# Patient Record
Sex: FEMALE | Race: WHITE | NOT HISPANIC OR LATINO | Employment: UNEMPLOYED | URBAN - METROPOLITAN AREA
[De-identification: names, ages, dates, MRNs, and addresses within clinical notes are randomized per-mention and may not be internally consistent; named-entity substitution may affect disease eponyms.]

---

## 2023-04-03 ENCOUNTER — HOSPITAL ENCOUNTER (OUTPATIENT)
Dept: DATA CONVERSION | Facility: HOSPITAL | Age: 63
End: 2023-04-03
Attending: INTERNAL MEDICINE | Admitting: INTERNAL MEDICINE
Payer: MEDICARE

## 2023-04-03 DIAGNOSIS — I34.0 NONRHEUMATIC MITRAL (VALVE) INSUFFICIENCY: ICD-10-CM

## 2023-04-03 DIAGNOSIS — I34.1 NONRHEUMATIC MITRAL (VALVE) PROLAPSE: ICD-10-CM

## 2023-09-06 VITALS — HEIGHT: 65 IN | WEIGHT: 164.24 LBS | BODY MASS INDEX: 27.36 KG/M2

## 2023-12-05 ENCOUNTER — OFFICE VISIT (OUTPATIENT)
Dept: OBSTETRICS AND GYNECOLOGY | Facility: CLINIC | Age: 63
End: 2023-12-05
Payer: MEDICARE

## 2023-12-05 VITALS
SYSTOLIC BLOOD PRESSURE: 114 MMHG | WEIGHT: 154 LBS | HEIGHT: 65 IN | BODY MASS INDEX: 25.66 KG/M2 | DIASTOLIC BLOOD PRESSURE: 73 MMHG | HEART RATE: 91 BPM

## 2023-12-05 DIAGNOSIS — N95.2 VAGINAL ATROPHY: ICD-10-CM

## 2023-12-05 DIAGNOSIS — Z46.89 PESSARY MAINTENANCE: ICD-10-CM

## 2023-12-05 DIAGNOSIS — N89.8 VAGINAL ITCHING: ICD-10-CM

## 2023-12-05 DIAGNOSIS — N81.4 UTEROVAGINAL PROLAPSE: Primary | ICD-10-CM

## 2023-12-05 DIAGNOSIS — R30.0 DYSURIA: ICD-10-CM

## 2023-12-05 LAB
POC APPEARANCE, URINE: CLEAR
POC BILIRUBIN, URINE: NEGATIVE
POC BLOOD, URINE: ABNORMAL
POC COLOR, URINE: YELLOW
POC GLUCOSE, URINE: ABNORMAL MG/DL
POC KETONES, URINE: NEGATIVE MG/DL
POC LEUKOCYTES, URINE: ABNORMAL
POC NITRITE,URINE: NEGATIVE
POC PH, URINE: 6.5 PH
POC PROTEIN, URINE: ABNORMAL MG/DL
POC SPECIFIC GRAVITY, URINE: 1.02
POC UROBILINOGEN, URINE: 0.2 EU/DL

## 2023-12-05 PROCEDURE — 81003 URINALYSIS AUTO W/O SCOPE: CPT | Performed by: NURSE PRACTITIONER

## 2023-12-05 PROCEDURE — 87086 URINE CULTURE/COLONY COUNT: CPT | Performed by: NURSE PRACTITIONER

## 2023-12-05 PROCEDURE — 1126F AMNT PAIN NOTED NONE PRSNT: CPT | Performed by: NURSE PRACTITIONER

## 2023-12-05 PROCEDURE — 1036F TOBACCO NON-USER: CPT | Performed by: NURSE PRACTITIONER

## 2023-12-05 PROCEDURE — 1159F MED LIST DOCD IN RCRD: CPT | Performed by: NURSE PRACTITIONER

## 2023-12-05 PROCEDURE — 99213 OFFICE O/P EST LOW 20 MIN: CPT | Mod: PO | Performed by: NURSE PRACTITIONER

## 2023-12-05 PROCEDURE — 87205 SMEAR GRAM STAIN: CPT | Performed by: NURSE PRACTITIONER

## 2023-12-05 PROCEDURE — 99213 OFFICE O/P EST LOW 20 MIN: CPT | Performed by: NURSE PRACTITIONER

## 2023-12-05 RX ORDER — ASPIRIN 81 MG/1
TABLET ORAL
COMMUNITY
Start: 2023-07-27

## 2023-12-05 RX ORDER — CEPHALEXIN 500 MG/1
1 CAPSULE ORAL 2 TIMES DAILY
COMMUNITY
Start: 2022-12-16

## 2023-12-05 RX ORDER — ESTRADIOL 0.1 MG/G
1 CREAM VAGINAL 2 TIMES WEEKLY
Qty: 42.5 G | Refills: 1 | Status: SHIPPED | OUTPATIENT
Start: 2023-12-07 | End: 2024-12-06

## 2023-12-05 ASSESSMENT — ENCOUNTER SYMPTOMS
CARDIOVASCULAR NEGATIVE: 1
DYSURIA: 1
PSYCHIATRIC NEGATIVE: 1
EYES NEGATIVE: 1
CONSTITUTIONAL NEGATIVE: 1
ENDOCRINE NEGATIVE: 1
ALLERGIC/IMMUNOLOGIC NEGATIVE: 1
RESPIRATORY NEGATIVE: 1
NEUROLOGICAL NEGATIVE: 1
MUSCULOSKELETAL NEGATIVE: 1
HEMATOLOGIC/LYMPHATIC NEGATIVE: 1
GASTROINTESTINAL NEGATIVE: 1

## 2023-12-05 ASSESSMENT — PATIENT HEALTH QUESTIONNAIRE - PHQ9
1. LITTLE INTEREST OR PLEASURE IN DOING THINGS: NOT AT ALL
2. FEELING DOWN, DEPRESSED OR HOPELESS: NOT AT ALL
SUM OF ALL RESPONSES TO PHQ9 QUESTIONS 1 AND 2: 0

## 2023-12-05 ASSESSMENT — PAIN SCALES - GENERAL: PAINLEVEL: 0-NO PAIN

## 2023-12-05 NOTE — PROGRESS NOTES
"Urogynecology Pessary Check Visit  Katt Dhaliwal, APRN-CNP  [phone]      History of Present Illness:  HPI  Ms. Yoanna Melendez  presents for pessary a check, pessary is in place to help manage uterine prolapse and manages this at home on her own. She has concerns today for yeast infection or BV.     Last visit: 2/28/2023   Pessary type: #5 ring with support in place to help manage uterine prolapse   Bleeding? Denies  Discomfort? None   Bowel or bladder symptoms: No changes to baseline   Vaginal estrogen: Not using E2  She manages the pessary at home on her own without any issues.     Urinary Symptoms:   - Endorses some burning dysuria while urinating.   - No hx of Krysta.     Vaginal Symptoms:   - The patient noted having sx of yeast vaginitis around 3 weeks ago and used OTC Vagisil which improved her vaginal itching symptoms. However, her symptoms of vaginal itching/burning returned 2 weeks ago.     Record Review:   - July 2023 @ [facility] her blood glucose level was slightly elevated at 102 mg/dL. She notes being on a new cardiac medication (Jardiance) which could be related to causing elevated glucose levels.     Past medical, surgical, social, family, allergy, and medication histories were reviewed and updated in EPIC charting.       Review of Systems   Constitutional: Negative.    HENT: Negative.     Eyes: Negative.    Respiratory: Negative.     Cardiovascular: Negative.    Gastrointestinal: Negative.    Endocrine: Negative.    Genitourinary:  Positive for dysuria and vaginal discharge.   Musculoskeletal: Negative.    Skin: Negative.    Allergic/Immunologic: Negative.    Neurological: Negative.    Hematological: Negative.    Psychiatric/Behavioral: Negative.          Objective    /73   Pulse 91   Ht 1.651 m (5' 5\")   Wt 69.9 kg (154 lb)   BMI 25.63 kg/m²    Body mass index is 25.63 kg/m².     UA = >500 glucose, moderate blood, trace protein, trace leukocytes    Physical Exam:  Physical " Exam  Constitutional:       Appearance: Normal appearance.   Genitourinary:      Moderate vaginal atrophy present.     Vaginal exam comments: Collected e-swab.   HENT:      Head: Normocephalic and atraumatic.   Pulmonary:      Effort: Pulmonary effort is normal.   Psychiatric:         Mood and Affect: Mood normal.         Behavior: Behavior normal.         Thought Content: Thought content normal.         Judgment: Judgment normal.       Pelvic exam:   Speculum examination: The vagina and cervix were inspected and there were some superficial erosions (that did not require cauterization). No blood in vaginal vault. Normal discharge appreciated.  Collected e-swab. Moderate vaginal atrophy noted.   Bimanual exam: The uterus was small and mobile.  There were no masses or tenderness in the pelvis/adnexal region.   The pessary was removed, cleaned and replaced without complications.       Assessment & Plan:  63 y.o. woman with vaginal atrophy, dysuria/glycosuria caused by taking Jardiance, and uterine prolapse that is managed with a pessary. Comorbidities include: ALONZO and HLD.     Diagnoses:   #1 Uterine prolapse  #2 Vaginitis, vaginal itching  #3 Vaginal atrophy    Plan:    1. Uterine prolapse, pessary management  - #5 ring with support pessary was removed. Speculum exam did not reveal any areas of active bleeding or granulation tissue. There were some superficial erosions. Pessary was cleaned and replaced back.  - Of note she manages the pessary at home on her own without any issues.   - Satisfactory management with pessary with a few superficial erosions erosions. Continue current care.       2. Dysuria, glycosuria, hematuria, ddx: Glycosuria caused by taking Jardiance > acute UTI vs. diabetes  - POCT UA showed moderate blood, >500 glucose, trace protein, and trace leukocytes.   - Urine culture ordered.  - Counseled that poorly managed/undiagnosed diabetes is correlated with frequent UTI and yeast infections. We  encouraged her to possibly follow up with her PCP to check her hemoglobin A1c with >500 glucose in her urine and no prior hx of diabetes. Of note upon record review in July 2023 @ [facility] her blood glucose level was slightly elevated at 102 mg/dL. We are not subconscious she had diabetes but rather taking Jardiance is causing glycosuria and dysuria.   - She notes being on a new cardiac medication (Jardiance) which could be related to causing elevated glucose levels and we encouraged her to follow up with cardiology to further discuss this. We counseled that being on Jardiance is most likely the cause of glycosuria as Jardiance works by causing the kidneys to process/excrete glucose through the urine.     3. Vaginitis, vaginal itching  - She has been symptomatic of vaginitis with vaginal itching/burning for the past 3 weeks that improved with using OTC Vagisil but her sxs ultimately returned.   - Collected e-swab to evaluate for presence of BV or yeast.     4. Vaginal atrophy  - We reassured her that using tv estrogen cream has a localized effect to the vaginal tissue and is not a form of HRT such as po estrogen which produces a systemic effect. The risk of using vaginal estrogen cream in correlation with putting patients at greater risk for developing breast/endometrial cancer is extremely low as the estrogen acts locally and only 0.8% is absorbed systemically.   - Counseled that E2 will help improve vaginal atrophy and prevent erosions from the pessary. She is amenable to starting E2.   - Reassured her that tv estrogen cream should not cause osteoporosis.   - Sent Rx Estradiol cream to her preferred pharmacy with instructions to use intravaginally 2x/week.     Follow up in 3 months with CARIN Fernandez for a pessary check.      CARIN Wasserman-CNP     Scribe Attestation  By signing my name below, IAri Scribe, attest that this documentation has been prepared under the direction and in the  presence of OUMAR Wasserman on 12/05/2023 at 2:22 PM.   I, OUMAR Wasserman, personally performed the services described in this documentation which was scribed virtually and I confirm that it is both accurate and complete.

## 2023-12-06 LAB
BACTERIAL VAGINOSIS VAG-IMP: NORMAL
CLUE CELLS VAG LPF-#/AREA: NORMAL /[LPF]
NUGENT SCORE: 4
YEAST VAG WET PREP-#/AREA: NORMAL

## 2023-12-06 NOTE — RESULT ENCOUNTER NOTE
No vaginal infection,  but starting vaginal estrogen will help increase lactobacilli which is a welcomed effect

## 2023-12-07 LAB — BACTERIA UR CULT: NORMAL

## 2023-12-08 ENCOUNTER — TELEPHONE (OUTPATIENT)
Dept: OBSTETRICS AND GYNECOLOGY | Facility: CLINIC | Age: 63
End: 2023-12-08
Payer: MEDICARE

## 2023-12-08 NOTE — TELEPHONE ENCOUNTER
----- Message from OUMAR Wasserman sent at 12/6/2023  5:49 PM EST -----  No vaginal infection,  but starting vaginal estrogen will help increase lactobacilli which is a welcomed effect

## 2023-12-08 NOTE — TELEPHONE ENCOUNTER
Left voicemail message for pt that results are normal and can be seen on [employer] if she finishes her registration. Left office phone number [phone] to call with further questions.

## 2024-03-05 ENCOUNTER — APPOINTMENT (OUTPATIENT)
Dept: OBSTETRICS AND GYNECOLOGY | Facility: CLINIC | Age: 64
End: 2024-03-05
Payer: MEDICARE

## 2024-03-06 ENCOUNTER — HOSPITAL ENCOUNTER (OUTPATIENT)
Dept: RADIOLOGY | Facility: HOSPITAL | Age: 64
Discharge: HOME | End: 2024-03-06
Payer: MEDICARE

## 2024-03-06 ENCOUNTER — LAB (OUTPATIENT)
Dept: LAB | Facility: LAB | Age: 64
End: 2024-03-06
Payer: MEDICARE

## 2024-03-06 VITALS — WEIGHT: 140 LBS | BODY MASS INDEX: 23.9 KG/M2 | HEIGHT: 64 IN

## 2024-03-06 DIAGNOSIS — Z12.31 ENCOUNTER FOR SCREENING MAMMOGRAM FOR MALIGNANT NEOPLASM OF BREAST: ICD-10-CM

## 2024-03-06 DIAGNOSIS — E55.9 VITAMIN D DEFICIENCY, UNSPECIFIED: Primary | ICD-10-CM

## 2024-03-06 DIAGNOSIS — Z00.00 ENCOUNTER FOR GENERAL ADULT MEDICAL EXAMINATION WITHOUT ABNORMAL FINDINGS: ICD-10-CM

## 2024-03-06 LAB
25(OH)D3 SERPL-MCNC: 30 NG/ML (ref 31–100)
ALBUMIN SERPL-MCNC: 4.2 G/DL (ref 3.5–5)
ALP BLD-CCNC: 60 U/L (ref 35–125)
ALT SERPL-CCNC: 16 U/L (ref 5–40)
ANION GAP SERPL CALC-SCNC: 12 MMOL/L
AST SERPL-CCNC: 18 U/L (ref 5–40)
BASOPHILS # BLD AUTO: 0.04 X10*3/UL (ref 0–0.1)
BASOPHILS NFR BLD AUTO: 1.1 %
BILIRUB SERPL-MCNC: 0.2 MG/DL (ref 0.1–1.2)
BUN SERPL-MCNC: 21 MG/DL (ref 8–25)
CALCIUM SERPL-MCNC: 9.2 MG/DL (ref 8.5–10.4)
CHLORIDE SERPL-SCNC: 104 MMOL/L (ref 97–107)
CHOLEST SERPL-MCNC: 205 MG/DL (ref 0–199)
CHOLESTEROL/HDL RATIO: 3.7
CO2 SERPL-SCNC: 25 MMOL/L (ref 24–31)
CREAT SERPL-MCNC: 1 MG/DL (ref 0.4–1.6)
EGFRCR SERPLBLD CKD-EPI 2021: 63 ML/MIN/1.73M*2
EOSINOPHIL # BLD AUTO: 0.13 X10*3/UL (ref 0–0.7)
EOSINOPHIL NFR BLD AUTO: 3.4 %
ERYTHROCYTE [DISTWIDTH] IN BLOOD BY AUTOMATED COUNT: 12.9 % (ref 11.5–14.5)
GLUCOSE SERPL-MCNC: 110 MG/DL (ref 65–99)
HCT VFR BLD AUTO: 37.8 % (ref 36–46)
HDLC SERPL-MCNC: 55.7 MG/DL
HGB BLD-MCNC: 12.2 G/DL (ref 12–16)
IMM GRANULOCYTES # BLD AUTO: 0 X10*3/UL (ref 0–0.7)
IMM GRANULOCYTES NFR BLD AUTO: 0 % (ref 0–0.9)
LYMPHOCYTES # BLD AUTO: 1.28 X10*3/UL (ref 1.2–4.8)
LYMPHOCYTES NFR BLD AUTO: 34 %
MAGNESIUM SERPL-MCNC: 2.2 MG/DL (ref 1.6–3.1)
MCH RBC QN AUTO: 31.9 PG (ref 26–34)
MCHC RBC AUTO-ENTMCNC: 32.3 G/DL (ref 32–36)
MCV RBC AUTO: 99 FL (ref 80–100)
MONOCYTES # BLD AUTO: 0.23 X10*3/UL (ref 0.1–1)
MONOCYTES NFR BLD AUTO: 6.1 %
NEUTROPHILS # BLD AUTO: 2.09 X10*3/UL (ref 1.2–7.7)
NEUTROPHILS NFR BLD AUTO: 55.4 %
NON-HDL CHOLESTEROL: 149 MG/DL (ref 0–149)
NRBC BLD-RTO: 0 /100 WBCS (ref 0–0)
PLATELET # BLD AUTO: 179 X10*3/UL (ref 150–450)
POTASSIUM SERPL-SCNC: 4 MMOL/L (ref 3.4–5.1)
PROT SERPL-MCNC: 6.2 G/DL (ref 5.9–7.9)
RBC # BLD AUTO: 3.83 X10*6/UL (ref 4–5.2)
SODIUM SERPL-SCNC: 141 MMOL/L (ref 133–145)
TSH SERPL DL<=0.05 MIU/L-ACNC: 0.69 MIU/L (ref 0.27–4.2)
WBC # BLD AUTO: 3.8 X10*3/UL (ref 4.4–11.3)

## 2024-03-06 PROCEDURE — 36415 COLL VENOUS BLD VENIPUNCTURE: CPT

## 2024-03-06 PROCEDURE — 77067 SCR MAMMO BI INCL CAD: CPT | Performed by: RADIOLOGY

## 2024-03-06 PROCEDURE — 77063 BREAST TOMOSYNTHESIS BI: CPT | Performed by: RADIOLOGY

## 2024-03-06 PROCEDURE — 80053 COMPREHEN METABOLIC PANEL: CPT

## 2024-03-06 PROCEDURE — 83735 ASSAY OF MAGNESIUM: CPT

## 2024-03-06 PROCEDURE — 77067 SCR MAMMO BI INCL CAD: CPT

## 2024-03-06 PROCEDURE — 82306 VITAMIN D 25 HYDROXY: CPT

## 2024-03-06 PROCEDURE — 82465 ASSAY BLD/SERUM CHOLESTEROL: CPT | Mod: WAIVER OF LIABILITY ON FILE

## 2024-03-06 PROCEDURE — 84443 ASSAY THYROID STIM HORMONE: CPT | Mod: WAIVER OF LIABILITY ON FILE

## 2024-03-06 PROCEDURE — 83718 ASSAY OF LIPOPROTEIN: CPT | Mod: WAIVER OF LIABILITY ON FILE

## 2024-03-06 PROCEDURE — 85025 COMPLETE CBC W/AUTO DIFF WBC: CPT

## 2024-04-10 ENCOUNTER — HOSPITAL ENCOUNTER (OUTPATIENT)
Dept: RADIOLOGY | Facility: HOSPITAL | Age: 64
Discharge: HOME | End: 2024-04-10
Payer: MEDICARE

## 2024-04-10 DIAGNOSIS — M81.0 AGE-RELATED OSTEOPOROSIS WITHOUT CURRENT PATHOLOGICAL FRACTURE: ICD-10-CM

## 2024-04-10 DIAGNOSIS — Z13.820 ENCOUNTER FOR SCREENING FOR OSTEOPOROSIS: ICD-10-CM

## 2024-04-10 PROCEDURE — 77080 DXA BONE DENSITY AXIAL: CPT

## 2024-04-10 PROCEDURE — 77080 DXA BONE DENSITY AXIAL: CPT | Performed by: RADIOLOGY

## 2025-06-09 ENCOUNTER — HOSPITAL ENCOUNTER (OUTPATIENT)
Dept: RADIOLOGY | Facility: HOSPITAL | Age: 65
Discharge: HOME | End: 2025-06-09
Payer: MEDICARE

## 2025-06-09 DIAGNOSIS — Z12.31 ENCOUNTER FOR SCREENING MAMMOGRAM FOR MALIGNANT NEOPLASM OF BREAST: ICD-10-CM

## 2025-06-09 PROCEDURE — 77063 BREAST TOMOSYNTHESIS BI: CPT | Performed by: STUDENT IN AN ORGANIZED HEALTH CARE EDUCATION/TRAINING PROGRAM

## 2025-06-09 PROCEDURE — 77067 SCR MAMMO BI INCL CAD: CPT | Performed by: STUDENT IN AN ORGANIZED HEALTH CARE EDUCATION/TRAINING PROGRAM

## 2025-06-09 PROCEDURE — 77067 SCR MAMMO BI INCL CAD: CPT
